# Patient Record
Sex: FEMALE | Race: WHITE
[De-identification: names, ages, dates, MRNs, and addresses within clinical notes are randomized per-mention and may not be internally consistent; named-entity substitution may affect disease eponyms.]

---

## 2018-04-02 ENCOUNTER — HOSPITAL ENCOUNTER (EMERGENCY)
Dept: HOSPITAL 25 - UCCORT | Age: 23
Discharge: HOME | End: 2018-04-02
Payer: COMMERCIAL

## 2018-04-02 VITALS — SYSTOLIC BLOOD PRESSURE: 113 MMHG | DIASTOLIC BLOOD PRESSURE: 70 MMHG

## 2018-04-02 DIAGNOSIS — J02.9: Primary | ICD-10-CM

## 2018-04-02 PROCEDURE — 87651 STREP A DNA AMP PROBE: CPT

## 2018-04-02 PROCEDURE — 99202 OFFICE O/P NEW SF 15 MIN: CPT

## 2018-04-02 PROCEDURE — G0463 HOSPITAL OUTPT CLINIC VISIT: HCPCS

## 2018-04-02 NOTE — UC
UC General HPI





- HPI Summary


HPI Summary: 





pt is c/o a sore throat, headache and fever since yesterday. last took tylenol 

this am. + congestion.





- History of Current Complaint


Hx Obtained From: Patient


Hx Last Menstrual Period: [id number]


Onset/Duration: Gradual Onset


Timing: Constant


Pain Intensity: 6


Aggravating: nothing


Alleviating: nothing


Associated Signs & Symptoms: Positive: Fever, Headache.  Negative: Cough, 

Diarrhea, Nausea, SOB, Vomiting





<Jaimie Malcolm - Last Filed: 04/02/18 15:22>





<Eva Rubi - Last Filed: 04/02/18 18:14>





- History of Current Complaint


Chief Complaint: UCGeneralIllness


Stated Complaint: SORE THROAT


Time Seen by Provider: 04/02/18 15:05





- Allergy/Home Medications


Allergies/Adverse Reactions: 


 Allergies











Allergy/AdvReac Type Severity Reaction Status Date / Time


 


No Known Allergies Allergy   Verified 04/02/18 14:53











Home Medications: 


 Home Medications





D-Methorphan/PE/Acetaminophen [Theraflu Expressmax Cold-Cough]  04/02/18 [

History]


Norgestimate-Ethinyl Estradiol [Ortho-Cyclen 28 Tablet]  04/02/18 [History]


diphenhydrAMINE HCl [Benadryl Allergy 25 MG CAP]  04/02/18 [History]











PMH/Surg Hx/FS Hx/Imm Hx


Previously Healthy: Yes





- Surgical History


Surgical History: Yes


Surgery Procedure, Year, and Place: WISDOM TEETH REMOVED





- Social History


Occupation: Student


Alcohol Use: Occasionally


Substance Use Type: None


Smoking Status (MU): Never Smoked Tobacco





- Immunization History


Vaccination Up to Date: Yes





<Jaimie Malcolm - Last Filed: 04/02/18 15:22>





Review of Systems


Constitutional: Fever


ENT: Sore Throat, Sinus Congestion


Neurological: Headache


Is Patient Immunocompromised?: No


All Other Systems Reviewed And Are Negative: Yes





<Jaimie Malcolm - Last Filed: 04/02/18 15:22>





Physical Exam


Triage Information Reviewed: Yes


Appearance: Well-Appearing


Vital Signs: 


 Initial Vital Signs











Temp  100.8 F   04/02/18 14:49


 


Pulse  101   04/02/18 14:49


 


Resp  18   04/02/18 14:49


 


BP  113/70   04/02/18 14:49


 


Pulse Ox  99   04/02/18 14:49











Vital Signs Reviewed: Yes


Eye Exam: Normal


ENT: Positive: Pharyngeal erythema, TMs normal, Uvula midline.  Negative: Nasal 

congestion, Nasal drainage, Tonsillar swelling, Tonsillar exudate, Trismus, 

Muffled voice, Hoarse voice


Neck: Positive: Supple, Nontender, Enlarged Nodes @ - peritonsilar(slight)


Respiratory: Positive: Lungs clear, Normal breath sounds


Cardiovascular: Positive: RRR, No Murmur


Abdomen Description: Positive: Nontender, No Organomegaly, Soft


Bowel Sounds: Positive: Present


Musculoskeletal: Positive: ROM Intact


Neurological: Positive: Alert


Psychological: Positive: Age Appropriate Behavior


Skin Exam: Normal





<Jaimie Malcolm - Last Filed: 04/02/18 15:22>


Vital Signs: 


 Initial Vital Signs











Temp  100.8 F   04/02/18 14:49


 


Pulse  101   04/02/18 14:49


 


Resp  18   04/02/18 14:49


 


BP  113/70   04/02/18 14:49


 


Pulse Ox  99   04/02/18 14:49














<Eva Rubi - Last Filed: 04/02/18 18:14>





Diagnostics





- Laboratory


Diagnostic Studies Completed/Ordered: rapid strep=neg





<Jaimie Malcolm - Last Filed: 04/02/18 15:22>





Course/Dx





- Course


Course Of Treatment: rapid strep=neg, tx supportive.





- Differential Dx - Multi-Symptom


Provider Diagnoses: pharyngitis





<Jaimie Malcolm - Last Filed: 04/02/18 15:22>





Discharge





- Sign-Out/Discharge


Documenting (check all that apply): Discharge





- Billing Disposition and Condition


Condition: STABLE


Disposition: HOME





<Jaimie Malcolm - Last Filed: 04/02/18 15:22>





- Billing Disposition and Condition


Condition: STABLE


Disposition: HOME





<Eva Rubi - Last Filed: 04/02/18 18:14>





- Discharge Plan


Condition: Stable


Disposition: HOME


Patient Education Materials:  Pharyngitis (ED)


Referrals: 


Mario FERRELL,Tom PEÑA [Primary Care Provider] - 5 Days





Attestation Statement


User Type: Provider - I was available for consult. This patient was seen by the 

EPI. The patient was not presented to, seen by, or examined by me. -Abrahan





<Eva Rubi - Last Filed: 04/02/18 18:14>